# Patient Record
Sex: FEMALE | Race: BLACK OR AFRICAN AMERICAN | NOT HISPANIC OR LATINO | Employment: UNEMPLOYED | URBAN - METROPOLITAN AREA
[De-identification: names, ages, dates, MRNs, and addresses within clinical notes are randomized per-mention and may not be internally consistent; named-entity substitution may affect disease eponyms.]

---

## 2017-03-01 ENCOUNTER — TELEPHONE (OUTPATIENT)
Dept: OBSTETRICS AND GYNECOLOGY | Facility: CLINIC | Age: 42
End: 2017-03-01

## 2017-03-01 DIAGNOSIS — R10.2 PELVIC PAIN: Primary | ICD-10-CM

## 2017-03-01 NOTE — TELEPHONE ENCOUNTER
Orders for pelvic ultrasound, urine culture and urine gonorrhea/chlamdyia placed. The patient will need to call the diagnostic center to have these tests completed before her visit with me on march 8.    Dr pelletier

## 2017-03-01 NOTE — TELEPHONE ENCOUNTER
----- Message from Carey Russo sent at 3/1/2017  9:03 AM CST -----  No. [phone]   Patient is having lower stomach pain.  Patient would like an appointment on Thursday, 3/2/17 or Friday, 3/3/17.        Scheduling patient for 3/8/17 9:45, you can go ahead and put what orders you said you would put in before she comes to the appt and ill call the patient and let her know she can make her appt downstairs

## 2017-03-02 ENCOUNTER — TELEPHONE (OUTPATIENT)
Dept: OBSTETRICS AND GYNECOLOGY | Facility: CLINIC | Age: 42
End: 2017-03-02

## 2017-03-02 NOTE — TELEPHONE ENCOUNTER
Spoke with pt. And told her that she is to go to the Diagnostic Lab, got a urine collection, and to have a pelvic u/s as well, before her appt. Here with us on the 8th

## 2017-05-28 ENCOUNTER — HOSPITAL ENCOUNTER (EMERGENCY)
Facility: HOSPITAL | Age: 42
Discharge: HOME | End: 2017-05-28
Attending: EMERGENCY MEDICINE
Payer: MEDICAID

## 2017-05-28 VITALS
SYSTOLIC BLOOD PRESSURE: 138 MMHG | BODY MASS INDEX: 32.02 KG/M2 | WEIGHT: 174 LBS | HEIGHT: 62 IN | RESPIRATION RATE: 20 BRPM | TEMPERATURE: 99 F | OXYGEN SATURATION: 99 % | HEART RATE: 112 BPM | DIASTOLIC BLOOD PRESSURE: 78 MMHG

## 2017-05-28 DIAGNOSIS — R11.2 NON-INTRACTABLE VOMITING WITH NAUSEA, UNSPECIFIED VOMITING TYPE: Primary | ICD-10-CM

## 2017-05-28 LAB
ALBUMIN SERPL BCP-MCNC: 4.2 G/DL
ALP SERPL-CCNC: 59 U/L
ALT SERPL W/O P-5'-P-CCNC: 21 U/L
ANION GAP SERPL CALC-SCNC: 10 MMOL/L
AST SERPL-CCNC: 20 U/L
B-HCG UR QL: NEGATIVE
B-HCG UR QL: NEGATIVE
BASOPHILS # BLD AUTO: 0 K/UL
BASOPHILS NFR BLD: 0 %
BILIRUB SERPL-MCNC: 0.4 MG/DL
BUN SERPL-MCNC: 11 MG/DL
CALCIUM SERPL-MCNC: 9.7 MG/DL
CHLORIDE SERPL-SCNC: 107 MMOL/L
CO2 SERPL-SCNC: 21 MMOL/L
CREAT SERPL-MCNC: 0.8 MG/DL
CTP QC/QA: YES
CTP QC/QA: YES
DIFFERENTIAL METHOD: ABNORMAL
EOSINOPHIL # BLD AUTO: 0 K/UL
EOSINOPHIL NFR BLD: 0.1 %
ERYTHROCYTE [DISTWIDTH] IN BLOOD BY AUTOMATED COUNT: 12.8 %
EST. GFR  (AFRICAN AMERICAN): >60 ML/MIN/1.73 M^2
EST. GFR  (NON AFRICAN AMERICAN): >60 ML/MIN/1.73 M^2
GLUCOSE SERPL-MCNC: 129 MG/DL
HCT VFR BLD AUTO: 40.4 %
HGB BLD-MCNC: 13.8 G/DL
LYMPHOCYTES # BLD AUTO: 0.8 K/UL
LYMPHOCYTES NFR BLD: 7.5 %
MCH RBC QN AUTO: 32.2 PG
MCHC RBC AUTO-ENTMCNC: 34.2 %
MCV RBC AUTO: 94 FL
MONOCYTES # BLD AUTO: 0.3 K/UL
MONOCYTES NFR BLD: 2.9 %
NEUTROPHILS # BLD AUTO: 10 K/UL
NEUTROPHILS NFR BLD: 89.2 %
PLATELET # BLD AUTO: 241 K/UL
PMV BLD AUTO: 9.1 FL
POTASSIUM SERPL-SCNC: 4.2 MMOL/L
PROT SERPL-MCNC: 8.3 G/DL
RBC # BLD AUTO: 4.28 M/UL
SODIUM SERPL-SCNC: 138 MMOL/L
WBC # BLD AUTO: 11.14 K/UL

## 2017-05-28 PROCEDURE — 96374 THER/PROPH/DIAG INJ IV PUSH: CPT

## 2017-05-28 PROCEDURE — 80053 COMPREHEN METABOLIC PANEL: CPT

## 2017-05-28 PROCEDURE — 99283 EMERGENCY DEPT VISIT LOW MDM: CPT | Mod: 25

## 2017-05-28 PROCEDURE — 96375 TX/PRO/DX INJ NEW DRUG ADDON: CPT

## 2017-05-28 PROCEDURE — 25000003 PHARM REV CODE 250: Performed by: EMERGENCY MEDICINE

## 2017-05-28 PROCEDURE — 81025 URINE PREGNANCY TEST: CPT | Performed by: EMERGENCY MEDICINE

## 2017-05-28 PROCEDURE — 85025 COMPLETE CBC W/AUTO DIFF WBC: CPT

## 2017-05-28 PROCEDURE — 63600175 PHARM REV CODE 636 W HCPCS: Performed by: EMERGENCY MEDICINE

## 2017-05-28 RX ORDER — ONDANSETRON 4 MG/1
8 TABLET, ORALLY DISINTEGRATING ORAL EVERY 6 HOURS PRN
Qty: 12 TABLET | Refills: 0 | Status: SHIPPED | OUTPATIENT
Start: 2017-05-28 | End: 2017-09-06 | Stop reason: ALTCHOICE

## 2017-05-28 RX ORDER — KETOROLAC TROMETHAMINE 30 MG/ML
15 INJECTION, SOLUTION INTRAMUSCULAR; INTRAVENOUS
Status: COMPLETED | OUTPATIENT
Start: 2017-05-28 | End: 2017-05-28

## 2017-05-28 RX ORDER — ONDANSETRON 4 MG/1
4 TABLET, ORALLY DISINTEGRATING ORAL
Status: COMPLETED | OUTPATIENT
Start: 2017-05-28 | End: 2017-05-28

## 2017-05-28 RX ORDER — ONDANSETRON 2 MG/ML
4 INJECTION INTRAMUSCULAR; INTRAVENOUS
Status: COMPLETED | OUTPATIENT
Start: 2017-05-28 | End: 2017-05-28

## 2017-05-28 RX ADMIN — ONDANSETRON 4 MG: 2 INJECTION INTRAMUSCULAR; INTRAVENOUS at 08:05

## 2017-05-28 RX ADMIN — SODIUM CHLORIDE 1000 ML: 0.9 INJECTION, SOLUTION INTRAVENOUS at 08:05

## 2017-05-28 RX ADMIN — ONDANSETRON 4 MG: 4 TABLET, ORALLY DISINTEGRATING ORAL at 09:05

## 2017-05-28 RX ADMIN — KETOROLAC TROMETHAMINE 15 MG: 30 INJECTION, SOLUTION INTRAMUSCULAR at 08:05

## 2017-05-28 NOTE — ED PROVIDER NOTES
"Encounter Date: 2017       History     Chief Complaint   Patient presents with    Abdominal Pain     vomiting and diarrhea - began at 0100 this morning; abscess under left arm     Review of patient's allergies indicates:  No Known Allergies  41-year-old female presents to the emergency department with vomiting and diarrhea that started late last night.  She was able to keep tender down and this morning ate [employer], and started feeling nausea hot sweats and abdominal pain.  She reports having intermittent abdominal pain after eating, that is "ignorable" she has not had any chest pain, shortness of breath dysuria or back pain.          History reviewed. No pertinent past medical history.  Past Surgical History:   Procedure Laterality Date     SECTION      TUBAL LIGATION       Family History   Problem Relation Age of Onset    Breast cancer Sister     Hypertension Maternal Grandfather     Glaucoma Maternal Grandfather     Cancer Father     Hypertension Maternal Uncle     Glaucoma Maternal Uncle     Hypertension Maternal Aunt     Glaucoma Maternal Aunt     Hypertension Other      Social History   Substance Use Topics    Smoking status: Never Smoker    Smokeless tobacco: Never Used    Alcohol use No     Review of Systems   Eyes: Negative.    Endocrine: Negative.    Genitourinary: Negative.    All other systems reviewed and are negative.      Physical Exam     Initial Vitals [17 0725]   BP Pulse Resp Temp SpO2   138/78 (!) 112 20 98.6 °F (37 °C) 99 %     Physical Exam    Nursing note and vitals reviewed.  Constitutional: She appears well-developed and well-nourished. She appears distressed.   HENT:   Head: Normocephalic and atraumatic.   Eyes: EOM are normal. Pupils are equal, round, and reactive to light.   Neck: Normal range of motion. Neck supple.   Cardiovascular: Normal rate and normal heart sounds.   Pulmonary/Chest: Breath sounds normal.   Abdominal: Soft.   RUQ abdominal pain " and midepigastric tenderness  No suprapubic tenderness, no CVA tenderness   Musculoskeletal: Normal range of motion.   Neurological: She is alert and oriented to person, place, and time. She has normal strength. No cranial nerve deficit.   Skin: Skin is warm and dry.   Psychiatric: She has a normal mood and affect. Her behavior is normal. Thought content normal.         ED Course   Procedures  Labs Reviewed   COMPREHENSIVE METABOLIC PANEL   CBC W/ AUTO DIFFERENTIAL   PREGNANCY TEST, URINE RAPID             Medical Decision Making:   Initial Assessment:   42 yo F here with epigastric pain, nausea and vomiting  Differential Diagnosis:   Gastroenteritis, gastritis, peptic ulcer, cholelithiasis  Clinical Tests:   Lab Tests: Ordered and Reviewed  ED Management:  She felt better after zofran and toradol, was tolerating PO  She asked for zofran script.  Reviewed blood work with her, and given her work note.   Patient was counseled on reasons to return to the ED and expressed understanding, patient was discharged in stable condition with appropriate outpatient follow up plan.                      ED Course     Clinical Impression:   The encounter diagnosis was Non-intractable vomiting with nausea, unspecified vomiting type.          Mary Haynes MD  05/28/17 0465

## 2017-05-28 NOTE — DISCHARGE INSTRUCTIONS
1) PLEASE FOLLOW UP WITH YOUR PRIMARY CARE PROVIDER IN 3 DAYS IF YOU ARE NOT SIGNIFICANTLY IMPROVED  2) PLEASE TAKE YOUR MEDICATIONS AS PRESCRIBED  3) RETURN TO THE ED FOR WORSENING SYMPTOMS

## 2017-09-06 ENCOUNTER — HOSPITAL ENCOUNTER (EMERGENCY)
Facility: HOSPITAL | Age: 42
Discharge: HOME | End: 2017-09-06
Attending: EMERGENCY MEDICINE
Payer: MEDICAID

## 2017-09-06 VITALS
HEART RATE: 79 BPM | HEIGHT: 62 IN | OXYGEN SATURATION: 99 % | TEMPERATURE: 98 F | WEIGHT: 170 LBS | DIASTOLIC BLOOD PRESSURE: 70 MMHG | RESPIRATION RATE: 18 BRPM | BODY MASS INDEX: 31.28 KG/M2 | SYSTOLIC BLOOD PRESSURE: 146 MMHG

## 2017-09-06 DIAGNOSIS — K52.9 GASTROENTERITIS: ICD-10-CM

## 2017-09-06 DIAGNOSIS — R10.9 ABDOMINAL PAIN, UNSPECIFIED LOCATION: Primary | ICD-10-CM

## 2017-09-06 LAB
ALBUMIN SERPL BCP-MCNC: 3.9 G/DL
ALP SERPL-CCNC: 73 U/L
ALT SERPL W/O P-5'-P-CCNC: 14 U/L
ANION GAP SERPL CALC-SCNC: 11 MMOL/L
AST SERPL-CCNC: 16 U/L
B-HCG UR QL: NEGATIVE
BASOPHILS # BLD AUTO: 0.01 K/UL
BASOPHILS NFR BLD: 0.1 %
BILIRUB SERPL-MCNC: 0.3 MG/DL
BILIRUB UR QL STRIP: NEGATIVE
BUN SERPL-MCNC: 8 MG/DL
CALCIUM SERPL-MCNC: 9.5 MG/DL
CHLORIDE SERPL-SCNC: 107 MMOL/L
CLARITY UR: CLEAR
CO2 SERPL-SCNC: 22 MMOL/L
COLOR UR: YELLOW
CREAT SERPL-MCNC: 0.7 MG/DL
CTP QC/QA: YES
DIFFERENTIAL METHOD: ABNORMAL
EOSINOPHIL # BLD AUTO: 0 K/UL
EOSINOPHIL NFR BLD: 0.2 %
ERYTHROCYTE [DISTWIDTH] IN BLOOD BY AUTOMATED COUNT: 12.3 %
EST. GFR  (AFRICAN AMERICAN): >60 ML/MIN/1.73 M^2
EST. GFR  (NON AFRICAN AMERICAN): >60 ML/MIN/1.73 M^2
GLUCOSE SERPL-MCNC: 115 MG/DL
GLUCOSE UR QL STRIP: NEGATIVE
HCT VFR BLD AUTO: 40.2 %
HGB BLD-MCNC: 13.6 G/DL
HGB UR QL STRIP: ABNORMAL
KETONES UR QL STRIP: ABNORMAL
LEUKOCYTE ESTERASE UR QL STRIP: NEGATIVE
LIPASE SERPL-CCNC: 15 U/L
LYMPHOCYTES # BLD AUTO: 0.8 K/UL
LYMPHOCYTES NFR BLD: 5.7 %
MCH RBC QN AUTO: 31.6 PG
MCHC RBC AUTO-ENTMCNC: 33.8 G/DL
MCV RBC AUTO: 93 FL
MONOCYTES # BLD AUTO: 0.5 K/UL
MONOCYTES NFR BLD: 3.8 %
NEUTROPHILS # BLD AUTO: 11.9 K/UL
NEUTROPHILS NFR BLD: 89.7 %
NITRITE UR QL STRIP: NEGATIVE
PH UR STRIP: 7 [PH] (ref 5–8)
PLATELET # BLD AUTO: 278 K/UL
PMV BLD AUTO: 9 FL
POTASSIUM SERPL-SCNC: 4 MMOL/L
PROT SERPL-MCNC: 8.1 G/DL
PROT UR QL STRIP: ABNORMAL
RBC # BLD AUTO: 4.31 M/UL
SODIUM SERPL-SCNC: 140 MMOL/L
SP GR UR STRIP: 1.01 (ref 1–1.03)
URN SPEC COLLECT METH UR: ABNORMAL
UROBILINOGEN UR STRIP-ACNC: NEGATIVE EU/DL
WBC # BLD AUTO: 13.21 K/UL

## 2017-09-06 PROCEDURE — 96361 HYDRATE IV INFUSION ADD-ON: CPT

## 2017-09-06 PROCEDURE — 96375 TX/PRO/DX INJ NEW DRUG ADDON: CPT

## 2017-09-06 PROCEDURE — 63600175 PHARM REV CODE 636 W HCPCS: Performed by: PHYSICIAN ASSISTANT

## 2017-09-06 PROCEDURE — 25000003 PHARM REV CODE 250: Performed by: PHYSICIAN ASSISTANT

## 2017-09-06 PROCEDURE — 96374 THER/PROPH/DIAG INJ IV PUSH: CPT

## 2017-09-06 PROCEDURE — 85025 COMPLETE CBC W/AUTO DIFF WBC: CPT

## 2017-09-06 PROCEDURE — 80053 COMPREHEN METABOLIC PANEL: CPT

## 2017-09-06 PROCEDURE — 63600175 PHARM REV CODE 636 W HCPCS: Performed by: EMERGENCY MEDICINE

## 2017-09-06 PROCEDURE — 81025 URINE PREGNANCY TEST: CPT | Performed by: PHYSICIAN ASSISTANT

## 2017-09-06 PROCEDURE — 99284 EMERGENCY DEPT VISIT MOD MDM: CPT | Mod: 25

## 2017-09-06 PROCEDURE — 81003 URINALYSIS AUTO W/O SCOPE: CPT

## 2017-09-06 PROCEDURE — 83690 ASSAY OF LIPASE: CPT

## 2017-09-06 RX ORDER — MORPHINE SULFATE 2 MG/ML
6 INJECTION, SOLUTION INTRAMUSCULAR; INTRAVENOUS
Status: COMPLETED | OUTPATIENT
Start: 2017-09-06 | End: 2017-09-06

## 2017-09-06 RX ORDER — DICYCLOMINE HYDROCHLORIDE 20 MG/1
20 TABLET ORAL 2 TIMES DAILY
Qty: 20 TABLET | Refills: 0 | Status: SHIPPED | OUTPATIENT
Start: 2017-09-06 | End: 2017-10-06

## 2017-09-06 RX ORDER — ONDANSETRON 4 MG/1
4 TABLET, ORALLY DISINTEGRATING ORAL EVERY 8 HOURS PRN
Qty: 15 TABLET | Refills: 0 | Status: SHIPPED | OUTPATIENT
Start: 2017-09-06 | End: 2018-06-27

## 2017-09-06 RX ORDER — DIPHENHYDRAMINE HCL 25 MG
25 CAPSULE ORAL
Status: COMPLETED | OUTPATIENT
Start: 2017-09-06 | End: 2017-09-06

## 2017-09-06 RX ORDER — ONDANSETRON 2 MG/ML
4 INJECTION INTRAMUSCULAR; INTRAVENOUS
Status: COMPLETED | OUTPATIENT
Start: 2017-09-06 | End: 2017-09-06

## 2017-09-06 RX ADMIN — MORPHINE SULFATE 6 MG: 2 INJECTION, SOLUTION INTRAMUSCULAR; INTRAVENOUS at 03:09

## 2017-09-06 RX ADMIN — ONDANSETRON 4 MG: 2 INJECTION INTRAMUSCULAR; INTRAVENOUS at 02:09

## 2017-09-06 RX ADMIN — SODIUM CHLORIDE 1000 ML: 0.9 INJECTION, SOLUTION INTRAVENOUS at 02:09

## 2017-09-06 RX ADMIN — DIPHENHYDRAMINE HYDROCHLORIDE 25 MG: 25 CAPSULE ORAL at 04:09

## 2017-09-06 NOTE — ED PROVIDER NOTES
Encounter Date: 2017       History     Chief Complaint   Patient presents with    Dysuria     left back pain with nausea, and vomiting and dysuria since this morning.     Afebrile female with PMH of post partum depression and frequent mariguana use presents to the ED for evaluation of abdominal pain for the past one day. She intimally reported dysuria however denies any pain with urination but rather vaginal pressure that is exacerbated by urination. She describes this as a sharp sensation that radiates to the left flank and back with waxing and waning of severity. She does report nausea with multiple episodes of nonbloody/nonbilous emesis and few episodes of loose stool. She c/o chills associated with these episodes. She denies any fever, URI symptoms, CP, SOB, hematuria, vaginal bleeding or vaginal discharge. She denies any recent travel, sick contacts or recent ABX use. She has not tried any medications for the symptoms.       The history is provided by the patient.     Review of patient's allergies indicates:  No Known Allergies  History reviewed. No pertinent past medical history.  Past Surgical History:   Procedure Laterality Date     SECTION      TUBAL LIGATION       Family History   Problem Relation Age of Onset    Breast cancer Sister     Hypertension Maternal Grandfather     Glaucoma Maternal Grandfather     Cancer Father     Hypertension Maternal Uncle     Glaucoma Maternal Uncle     Hypertension Maternal Aunt     Glaucoma Maternal Aunt     Hypertension Other      Social History   Substance Use Topics    Smoking status: Never Smoker    Smokeless tobacco: Never Used    Alcohol use No     Review of Systems   Constitutional: Positive for appetite change and chills. Negative for fever.   HENT: Negative for congestion and sore throat.    Respiratory: Negative for cough and shortness of breath.    Cardiovascular: Negative for chest pain.   Gastrointestinal: Positive for abdominal pain  (lower abdominal pain), nausea and vomiting. Negative for constipation and diarrhea.   Genitourinary: Positive for flank pain. Negative for dysuria, hematuria, vaginal bleeding and vaginal discharge.   Musculoskeletal: Negative for back pain.   Skin: Negative for rash.   Neurological: Negative for weakness and headaches.   Hematological: Does not bruise/bleed easily.       Physical Exam     Initial Vitals [09/06/17 1416]   BP Pulse Resp Temp SpO2   134/70 91 15 97.8 °F (36.6 °C) 100 %      MAP       91.33         Physical Exam    Nursing note and vitals reviewed.  Constitutional: Vital signs are normal. She appears well-developed and well-nourished. She is cooperative.  Non-toxic appearance. She does not appear ill.   HENT:   Head: Normocephalic and atraumatic.   Mouth/Throat: Oropharynx is clear and moist. Mucous membranes are dry.   Eyes: Conjunctivae and lids are normal.   Neck: Neck supple. No neck rigidity.   Cardiovascular: Normal rate and regular rhythm.   Pulmonary/Chest: Breath sounds normal. No respiratory distress. She has no wheezes. She has no rhonchi.   Abdominal: Soft. Normal appearance and bowel sounds are normal. There is tenderness in the suprapubic area and left lower quadrant. There is no rigidity, no rebound, no guarding and no CVA tenderness.   Musculoskeletal: Normal range of motion.   Neurological: She is alert and oriented to person, place, and time. GCS eye subscore is 4. GCS verbal subscore is 5. GCS motor subscore is 6.   Skin: Skin is warm, dry and intact. No rash noted.   Psychiatric: She has a normal mood and affect. Her speech is normal and behavior is normal. Thought content normal.         ED Course   Procedures  Labs Reviewed   URINALYSIS - Abnormal; Notable for the following:        Result Value    Protein, UA Trace (*)     Ketones, UA Trace (*)     Occult Blood UA Trace (*)     All other components within normal limits   CBC W/ AUTO DIFFERENTIAL - Abnormal; Notable for the  following:     WBC 13.21 (*)     MCH 31.6 (*)     MPV 9.0 (*)     Gran # 11.9 (*)     Lymph # 0.8 (*)     Gran% 89.7 (*)     Lymph% 5.7 (*)     Mono% 3.8 (*)     All other components within normal limits   COMPREHENSIVE METABOLIC PANEL - Abnormal; Notable for the following:     CO2 22 (*)     Glucose 115 (*)     All other components within normal limits   LIPASE   POCT URINE PREGNANCY        Imaging Results          CT Renal Stone Study ABD Pelvis WO (Final result)  Result time 09/06/17 15:56:07    Final result by Esteban Gomez MD (09/06/17 15:56:07)                 Impression:     CT abdomen pelvis, urinary tract within range of normal.  Tiny density medial cecum of uncertain significance etiology.      Electronically signed by: FRANK GOMEZ MD  Date:     09/06/17  Time:    15:56              Narrative:    CT scan abdomen pelvis, urinary tract without contrast.  Uterus adnexal structures, urinary bladder normal.    Liver, gallbladder, spleen, adrenal glands, normal.    No free fluid or adenopathy.  Facet joint and arthropathy, especially right L5-S1, spondylosis dorsal and lumbar spine.    Lung bases clear.  No free air.    Appendix normal.  Circular density medial cecum, 0.58 CM image 54 series 2, image 24 series 601, uncertain significance etiology.                                 Medical Decision Making:   History:   Old Records Summarized: other records.       <> Summary of Records: Reviewed last ED visit in 05/17   Initial Assessment:   Afebrile female with PMH of post partum depression and frequent mariguana use presents to the ED for evaluation of abdominal pain for the past one day. She intimally reported dysuria however denies any pain with urination but rather vaginal pressure that is exacerbated by urination. She describes this as a sharp sensation that radiates to the left flank and back with waxing and waning of severity. She does report nausea with multiple episodes of  nonbloody/nonbilous emesis and few episodes of loose stool. She c/o chills associated with these episodes. She denies any fever, URI symptoms, CP, SOB, hematuria, vaginal bleeding or vaginal discharge. She denies any recent travel, sick contacts or recent ABX use. She has not tried any medications for the symptoms. PE reveals patient in no obvious distress. Mucous membranes are noted to be mildly dry. Lungs clear; heart regular rate and rhythm. Abdomen with TTP of the suprapubic and LLQ with no rebound, guarding or rigidity. FROM of all extremities skin free of rash, pallor or diaphoresis.  Differential Diagnosis:   UTI, peylonnephritis, gastroenteritis, enteritis, diverticulitis, cannabis hyperemeses, LIBRA, electrolyte abnormality  Clinical Tests:   Lab Tests: Ordered and Reviewed  Radiological Study: Ordered and Reviewed  ED Management:  Labs significant for mild leukocytosis with no other significant abnormal findings including UA. CT ordered as patient with TTP and elevated leukocytosis.. CT is unremarkable for acute abnormalities. I did consider vaginal source however patient denies any symptoms presently. Symptoms did improve in the ED with fluids, Zofran and morphine. Patient had slight local histamine response to morphine that resolved with benadryl. She did have successful PO challenge. And will be send home with symptomatic medications for this probable viral etiology. Instructed on diet for N/V/D. Patient was cautioned on when to return to ED. Patient verbalized understanding and agreement with the treatment plan                Attending Attestation:     Physician Attestation Statement for NP/PA:   I discussed this assessment and plan of this patient with the NP/PA, but I did not personally examine the patient. The face to face encounter was performed by the NP/PA.    Other NP/PA Attestation Additions:    History of Present Illness: 42F with LLQ abd pain and vaginal pressure    Medical Decision Making: No  fever, mild leukocytosis.  No other acute lab abnormalities.  CT did not show any acute findings to explain symptoms.                 ED Course      Clinical Impression:   The primary encounter diagnosis was Abdominal pain, unspecified location. A diagnosis of Gastroenteritis was also pertinent to this visit.                           AROLDO Fierro  09/10/17 [id number]       Shey Mathews MD  09/28/17 [id number]

## 2017-09-06 NOTE — ED NOTES
N/v/d with left flank pain and dysuria that began this am.  Reports has had episodes of sweating and chills and feeling hot today

## 2018-06-27 ENCOUNTER — HOSPITAL ENCOUNTER (OUTPATIENT)
Dept: RADIOLOGY | Facility: HOSPITAL | Age: 43
Discharge: HOME | End: 2018-06-27
Attending: OBSTETRICS & GYNECOLOGY
Payer: MEDICAID

## 2018-06-27 ENCOUNTER — TELEPHONE (OUTPATIENT)
Dept: OBSTETRICS AND GYNECOLOGY | Facility: CLINIC | Age: 43
End: 2018-06-27

## 2018-06-27 ENCOUNTER — OFFICE VISIT (OUTPATIENT)
Dept: OBSTETRICS AND GYNECOLOGY | Facility: CLINIC | Age: 43
End: 2018-06-27
Payer: MEDICAID

## 2018-06-27 ENCOUNTER — LAB VISIT (OUTPATIENT)
Dept: LAB | Facility: HOSPITAL | Age: 43
End: 2018-06-27
Attending: OBSTETRICS & GYNECOLOGY
Payer: MEDICAID

## 2018-06-27 VITALS
WEIGHT: 164.25 LBS | DIASTOLIC BLOOD PRESSURE: 80 MMHG | HEIGHT: 63 IN | SYSTOLIC BLOOD PRESSURE: 140 MMHG | BODY MASS INDEX: 29.1 KG/M2

## 2018-06-27 DIAGNOSIS — N92.0 MENORRHAGIA WITH REGULAR CYCLE: ICD-10-CM

## 2018-06-27 DIAGNOSIS — Z01.419 ENCOUNTER FOR GYNECOLOGICAL EXAMINATION WITHOUT ABNORMAL FINDING: ICD-10-CM

## 2018-06-27 DIAGNOSIS — Z12.4 CERVICAL CANCER SCREENING: ICD-10-CM

## 2018-06-27 DIAGNOSIS — Z01.419 ENCOUNTER FOR GYNECOLOGICAL EXAMINATION WITHOUT ABNORMAL FINDING: Primary | ICD-10-CM

## 2018-06-27 DIAGNOSIS — Z12.39 SCREENING BREAST EXAMINATION: ICD-10-CM

## 2018-06-27 DIAGNOSIS — Z12.39 BREAST CANCER SCREENING: ICD-10-CM

## 2018-06-27 DIAGNOSIS — R30.0 DYSURIA: ICD-10-CM

## 2018-06-27 LAB
BASOPHILS # BLD AUTO: 0.03 K/UL
BASOPHILS NFR BLD: 0.3 %
DIFFERENTIAL METHOD: ABNORMAL
EOSINOPHIL # BLD AUTO: 0.2 K/UL
EOSINOPHIL NFR BLD: 1.8 %
ERYTHROCYTE [DISTWIDTH] IN BLOOD BY AUTOMATED COUNT: 12.9 %
HCT VFR BLD AUTO: 37.7 %
HGB BLD-MCNC: 12.4 G/DL
LYMPHOCYTES # BLD AUTO: 3.1 K/UL
LYMPHOCYTES NFR BLD: 35.8 %
MCH RBC QN AUTO: 30.8 PG
MCHC RBC AUTO-ENTMCNC: 32.9 G/DL
MCV RBC AUTO: 94 FL
MONOCYTES # BLD AUTO: 0.7 K/UL
MONOCYTES NFR BLD: 7.8 %
NEUTROPHILS # BLD AUTO: 4.7 K/UL
NEUTROPHILS NFR BLD: 54.2 %
PLATELET # BLD AUTO: 303 K/UL
PMV BLD AUTO: 9 FL
RBC # BLD AUTO: 4.02 M/UL
TSH SERPL DL<=0.005 MIU/L-ACNC: 1.4 UIU/ML
WBC # BLD AUTO: 8.67 K/UL

## 2018-06-27 PROCEDURE — 86803 HEPATITIS C AB TEST: CPT

## 2018-06-27 PROCEDURE — 88175 CYTOPATH C/V AUTO FLUID REDO: CPT

## 2018-06-27 PROCEDURE — 86592 SYPHILIS TEST NON-TREP QUAL: CPT

## 2018-06-27 PROCEDURE — 85025 COMPLETE CBC W/AUTO DIFF WBC: CPT

## 2018-06-27 PROCEDURE — 99213 OFFICE O/P EST LOW 20 MIN: CPT | Mod: PBBFAC,PO | Performed by: OBSTETRICS & GYNECOLOGY

## 2018-06-27 PROCEDURE — 87624 HPV HI-RISK TYP POOLED RSLT: CPT

## 2018-06-27 PROCEDURE — 99396 PREV VISIT EST AGE 40-64: CPT | Mod: S$PBB,,, | Performed by: OBSTETRICS & GYNECOLOGY

## 2018-06-27 PROCEDURE — 77067 SCR MAMMO BI INCL CAD: CPT | Mod: TC

## 2018-06-27 PROCEDURE — 87491 CHLMYD TRACH DNA AMP PROBE: CPT

## 2018-06-27 PROCEDURE — 87086 URINE CULTURE/COLONY COUNT: CPT

## 2018-06-27 PROCEDURE — 84443 ASSAY THYROID STIM HORMONE: CPT

## 2018-06-27 PROCEDURE — 87340 HEPATITIS B SURFACE AG IA: CPT

## 2018-06-27 PROCEDURE — 99999 PR PBB SHADOW E&M-EST. PATIENT-LVL III: CPT | Mod: PBBFAC,,, | Performed by: OBSTETRICS & GYNECOLOGY

## 2018-06-27 PROCEDURE — 36415 COLL VENOUS BLD VENIPUNCTURE: CPT

## 2018-06-27 PROCEDURE — 86696 HERPES SIMPLEX TYPE 2 TEST: CPT

## 2018-06-27 PROCEDURE — 86703 HIV-1/HIV-2 1 RESULT ANTBDY: CPT

## 2018-06-27 PROCEDURE — 77063 BREAST TOMOSYNTHESIS BI: CPT | Mod: 26,,, | Performed by: RADIOLOGY

## 2018-06-27 PROCEDURE — 77067 SCR MAMMO BI INCL CAD: CPT | Mod: 26,,, | Performed by: RADIOLOGY

## 2018-06-27 PROCEDURE — 86706 HEP B SURFACE ANTIBODY: CPT

## 2018-06-27 NOTE — TELEPHONE ENCOUNTER
----- Message from Adelaida Chen sent at 6/27/2018  2:30 PM CDT -----  Contact: [facility]  The patient's authorization for her MAMMO needs to be filled out since she was able to be seen today for the procedure.

## 2018-06-27 NOTE — MEDICAL/APP STUDENT
"44 yo  female presenting for routine gyn visit.   LMP 18  Last mammogram 2014  wnl  Last pap smear 2014 wnl.  Patient requests Pap smear today.    Patient has several complaints:    1) menorrhagia  Patient complains of regular heavy menses that started 4 months ago.  Menarche began at 11.  Always regular.  No AUB.   Patient is not current on any Rx.   Patient reports weight loss of 20 lbs over the past month - part of it was intentional.   Patient reports palpitations and "feeling whole body is hot" in the past few months.    2) urine Sx:   Patient complains of urinary frequency, urgency and stress incontinence, as well as flank pain and lower back pain (10 out of 10).  She tried ibuprofen which first relieve the pain and now does not anymore.     3) sexual hx:  Sexually active with a long-term male partner.   Patient had BTL in .   No h/o STI  Patient requests complete STI testing today.    4) patient also reports brown odorous vaginal discharge "every now and then".     ROS:  See HPI    PE:     Vitals: BP (!) 140/80   Ht 5' 3" (1.6 m)   Wt 74.5 kg (164 lb 3.9 oz)   LMP 2018   Breastfeeding? No   BMI 29.09 kg/m²   APPEARANCE: Well nourished, well developed, in no acute distress.  CHEST: Lungs clear to auscultation.  HEART: Regular rate and rhythm, no murmurs, rubs or gallops.  BREASTS: Symmetrical, no skin changes or visible lesions. No palpable masses, nipple discharge or adenopathy bilaterally.   PELVIC: Normal external female genitalia without lesions. Normal hair distribution. Vagina moist and well rugated without lesions or discharge. Cervix pink and without lesions. Bimanual exam showed uterus normal size, shape, position, mobile. Suprapubic tenderness.  Adnexa without masses or tenderness.     AP    1) Routine gyn  -s/p normal breast exam  -Pap and HPV: collected at patient's request  -STD testing: ordered  -mammogram: ordered  -patient given list of PCPs    2) " menorrhagia  -CBC, TSH levels ordered  -pelvic US ordered    3) possible UTI  -pelvic exam showed suprapubic tenderness  -urine culture ordered        Amy Shaw  Medical student

## 2018-06-27 NOTE — PROGRESS NOTES
"42 yo  female who presents for routine gyn visit.  Patient reports that cycles come q month but have become quite heavy in the last 4 months.  Patient reports that she can soak many pads in a day which is different from previous cycles.  LMP 18.      Patient denies any new sexual partners since her last visit here. But, desires STD testing.  She also complains of increased urinary frequency, suprapubic discomfort, and pain in her lower back (mostly on the left side).    Patient denies h/o STDs.  H/o BTL in 2016.    Last mammogram 2014  wnl  Last pap smear 2014 wnl.  Patient requests Pap smear today.    ROS: positive for weight loss (20 pds), reports feeling "hot all over"       PE:   Vitals: BP (!) 140/80   Ht 5' 3" (1.6 m)   Wt 74.5 kg (164 lb 3.9 oz)   LMP 2018   Breastfeeding? No   BMI 29.09 kg/m²   APPEARANCE: Well nourished, well developed, in no acute distress.  CHEST: Lungs clear to auscultation.  HEART: Regular rate and rhythm, no murmurs, rubs or gallops.  Abdomen: no rebound, no guarding. No masses.  BREASTS: Symmetrical, no skin changes or visible lesions. No palpable masses, nipple discharge or adenopathy bilaterally.   PELVIC: Normal external female genitalia without lesions. Normal hair distribution. Vagina moist and well rugated without lesions or discharge. Cervix pink and without lesions. Bimanual exam showed uterus normal size, shape, position, mobile. Suprapubic tenderness.  Adnexa without masses or tenderness.      AP     1) Routine gyn  -s/p normal breast exam - mammogram ordered  -Pap and HPV: collected  -STD testing: gc/chl, hiv, rpr, hsv, hep b/c  -patient given list of PCPs     2) Menorrhagia  -CBC, TSH levels ordered  -pelvic US ordered     3) possible UTI  -urine culture ordered     NATALI pelletier MD    "

## 2018-06-27 NOTE — TELEPHONE ENCOUNTER
"I spoke with the patient and she says she had her mammogram done today and "they" said it needed to be cleared or she might get a bill.  I explained that we don't do anything with the insurance but Dr. Flower had done the order and if she got a bill, she would need to follow up with the billing department.  She understood and had no further questions.  "

## 2018-06-28 ENCOUNTER — PROCEDURE VISIT (OUTPATIENT)
Dept: OBSTETRICS AND GYNECOLOGY | Facility: CLINIC | Age: 43
End: 2018-06-28
Payer: MEDICAID

## 2018-06-28 DIAGNOSIS — N92.0 MENORRHAGIA WITH REGULAR CYCLE: ICD-10-CM

## 2018-06-28 DIAGNOSIS — R10.2 PELVIC PAIN IN FEMALE: Primary | ICD-10-CM

## 2018-06-28 LAB
HBV SURFACE AB SER-ACNC: POSITIVE M[IU]/ML
HBV SURFACE AG SERPL QL IA: NEGATIVE
HCV AB SERPL QL IA: NEGATIVE
HIV 1+2 AB+HIV1 P24 AG SERPL QL IA: NEGATIVE
RPR SER QL: NORMAL

## 2018-06-28 PROCEDURE — 76830 TRANSVAGINAL US NON-OB: CPT | Mod: PBBFAC,PO

## 2018-06-28 PROCEDURE — 76830 TRANSVAGINAL US NON-OB: CPT | Mod: 26,S$PBB,, | Performed by: OBSTETRICS & GYNECOLOGY

## 2018-06-29 LAB
BACTERIA UR CULT: NORMAL
BACTERIA UR CULT: NORMAL
C TRACH DNA SPEC QL NAA+PROBE: NOT DETECTED
HSV1 IGG SERPL QL IA: POSITIVE
HSV2 IGG SERPL QL IA: POSITIVE
N GONORRHOEA DNA SPEC QL NAA+PROBE: NOT DETECTED

## 2018-07-02 ENCOUNTER — TELEPHONE (OUTPATIENT)
Dept: OBSTETRICS AND GYNECOLOGY | Facility: CLINIC | Age: 43
End: 2018-07-02

## 2018-07-02 NOTE — TELEPHONE ENCOUNTER
----- Message from Radha Flower MD sent at 7/2/2018  3:41 AM CDT -----  Inform patient that her vaginal swabs for STDs was negative for infections.

## 2018-07-03 ENCOUNTER — TELEPHONE (OUTPATIENT)
Dept: OBSTETRICS AND GYNECOLOGY | Facility: CLINIC | Age: 43
End: 2018-07-03

## 2018-07-03 LAB
HPV HR 12 DNA CVX QL NAA+PROBE: NEGATIVE
HPV16 AG SPEC QL: NEGATIVE
HPV18 DNA SPEC QL NAA+PROBE: NEGATIVE

## 2018-07-06 ENCOUNTER — TELEPHONE (OUTPATIENT)
Dept: OBSTETRICS AND GYNECOLOGY | Facility: CLINIC | Age: 43
End: 2018-07-06

## 2018-07-06 NOTE — TELEPHONE ENCOUNTER
Pt. Was given normal u/s results, and will contact us back about her heavy bleeding, if she would like to proceed with meds. Or surgery, she said thank you for calling her.

## 2019-12-26 ENCOUNTER — HOSPITAL ENCOUNTER (EMERGENCY)
Facility: HOSPITAL | Age: 44
Discharge: HOME | End: 2019-12-26
Attending: EMERGENCY MEDICINE
Payer: MEDICAID

## 2019-12-26 VITALS
HEART RATE: 88 BPM | TEMPERATURE: 99 F | HEIGHT: 62 IN | BODY MASS INDEX: 27.6 KG/M2 | OXYGEN SATURATION: 100 % | SYSTOLIC BLOOD PRESSURE: 170 MMHG | DIASTOLIC BLOOD PRESSURE: 82 MMHG | WEIGHT: 150 LBS | RESPIRATION RATE: 20 BRPM

## 2019-12-26 DIAGNOSIS — R52 PAIN: ICD-10-CM

## 2019-12-26 DIAGNOSIS — M25.512 ACUTE PAIN OF LEFT SHOULDER: Primary | ICD-10-CM

## 2019-12-26 LAB
B-HCG UR QL: NEGATIVE
CTP QC/QA: YES

## 2019-12-26 PROCEDURE — 96372 THER/PROPH/DIAG INJ SC/IM: CPT

## 2019-12-26 PROCEDURE — 25000003 PHARM REV CODE 250: Performed by: NURSE PRACTITIONER

## 2019-12-26 PROCEDURE — 99284 EMERGENCY DEPT VISIT MOD MDM: CPT | Mod: 25

## 2019-12-26 PROCEDURE — 63600175 PHARM REV CODE 636 W HCPCS: Performed by: NURSE PRACTITIONER

## 2019-12-26 RX ORDER — LIDOCAINE 50 MG/G
1 PATCH TOPICAL DAILY
Qty: 3 PATCH | Refills: 0 | Status: SHIPPED | OUTPATIENT
Start: 2019-12-26 | End: 2022-01-04

## 2019-12-26 RX ORDER — METHOCARBAMOL 500 MG/1
1500 TABLET, FILM COATED ORAL 3 TIMES DAILY
Qty: 30 TABLET | Refills: 0 | Status: SHIPPED | OUTPATIENT
Start: 2019-12-26 | End: 2019-12-31

## 2019-12-26 RX ORDER — KETOROLAC TROMETHAMINE 30 MG/ML
30 INJECTION, SOLUTION INTRAMUSCULAR; INTRAVENOUS
Status: COMPLETED | OUTPATIENT
Start: 2019-12-26 | End: 2019-12-26

## 2019-12-26 RX ORDER — LIDOCAINE 50 MG/G
1 PATCH TOPICAL ONCE
Status: DISCONTINUED | OUTPATIENT
Start: 2019-12-26 | End: 2019-12-26 | Stop reason: HOSPADM

## 2019-12-26 RX ORDER — KETOROLAC TROMETHAMINE 10 MG/1
10 TABLET, FILM COATED ORAL
Qty: 14 TABLET | Refills: 0 | Status: SHIPPED | OUTPATIENT
Start: 2019-12-26 | End: 2022-01-04

## 2019-12-26 RX ORDER — METHOCARBAMOL 750 MG/1
1500 TABLET, FILM COATED ORAL
Status: COMPLETED | OUTPATIENT
Start: 2019-12-26 | End: 2019-12-26

## 2019-12-26 RX ADMIN — KETOROLAC TROMETHAMINE 30 MG: 30 INJECTION, SOLUTION INTRAMUSCULAR at 04:12

## 2019-12-26 RX ADMIN — LIDOCAINE 1 PATCH: 50 PATCH TOPICAL at 04:12

## 2019-12-26 RX ADMIN — METHOCARBAMOL TABLETS 1500 MG: 750 TABLET, COATED ORAL at 04:12

## 2019-12-26 NOTE — ED PROVIDER NOTES
Encounter Date: 2019       History     Chief Complaint   Patient presents with    Shoulder Pain     left shoulder pain x 4 days ago denies any trauma      44-year-old female presents ED for evaluation of left shoulder pain x4 days.  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  Patient states that the pain did not get worse until today.  Has been taking Tylenol arthritis with no improvement.  States the pain is worse with movement palpation.  Denies any previous injuries.  Denies any alleviating factors.  Denies fever, numbness, tingling, weakness, chest pain, shortness of breath, or any other concerns.    The history is provided by the patient.     Review of patient's allergies indicates:  No Known Allergies  History reviewed. No pertinent past medical history.  Past Surgical History:   Procedure Laterality Date     SECTION      TUBAL LIGATION       Family History   Problem Relation Age of Onset    Breast cancer Sister     Hypertension Maternal Grandfather     Glaucoma Maternal Grandfather     Cancer Father     Hypertension Maternal Uncle     Glaucoma Maternal Uncle     Hypertension Maternal Aunt     Glaucoma Maternal Aunt     Hypertension Other      Social History     Tobacco Use    Smoking status: Never Smoker    Smokeless tobacco: Never Used   Substance Use Topics    Alcohol use: No    Drug use: Yes     Types: Marijuana     Review of Systems   Constitutional: Negative for fever.   Respiratory: Negative for shortness of breath.    Cardiovascular: Negative for chest pain.   Musculoskeletal: Positive for arthralgias.   Neurological: Negative for weakness and numbness.   Hematological: Does not bruise/bleed easily.   All other systems reviewed and are negative.      Physical Exam     Initial Vitals [19 1458]   BP Pulse Resp Temp SpO2   (!) 154/84 108 18 99.1 °F (37.3 °C) 99 %      MAP       --          Physical Exam    Vitals reviewed.  Constitutional: She appears well-developed and well-nourished.  Non-toxic appearance. She does not have a sickly appearance.   Tearful.   HENT:   Head: Atraumatic.   Mouth/Throat: Oropharynx is clear and moist.   Eyes: EOM are normal.   Neck: Normal range of motion, full passive range of motion without pain and phonation normal. Neck supple.   Cardiovascular: Regular rhythm. Tachycardia present.    Pulses:       Radial pulses are 2+ on the right side, and 2+ on the left side.   Pulmonary/Chest: No respiratory distress.   Musculoskeletal:        Left shoulder: She exhibits decreased range of motion, tenderness and pain. She exhibits no bony tenderness, no swelling, no effusion, no crepitus, no deformity, no laceration, no spasm, normal pulse and normal strength.   Decreased ROM secondary pain.  No overlying skin changes.  Sensation and strength intact in BUE.  Radial pulses equal bilaterally.   Neurological: She is alert and oriented to person, place, and time. She has normal strength. No sensory deficit.   Skin: Skin is warm. No rash noted.   Psychiatric: She has a normal mood and affect.         ED Course   Procedures  Labs Reviewed - No data to display       Imaging Results          X-Ray Shoulder 2 or More Views Left (Final result)  Result time 12/26/19 16:33:11   Procedure changed from X-Ray Shoulder Left 1 View     Final result by Lazaro Sosa MD (12/26/19 16:33:11)                 Impression:      No evidence of acute fracture or dislocation of the left shoulder.    Calcifications along the left biceps tendons.  The findings most likely represent calcific tendinitis.      Electronically signed by: Lazaro Sosa MD  Date:    12/26/2019  Time:    16:33             Narrative:    EXAMINATION:  XR SHOULDER COMPLETE 2 OR MORE VIEWS LEFT    CLINICAL HISTORY:  pain;Pain, unspecified    TECHNIQUE:  Two or three views of the left shoulder were  performed.    COMPARISON:  None    FINDINGS:  The bone mineralization is within normal limits.  There is no evidence of periostitis.  No cortical step-off is identified.    The glenohumeral articulation is maintained.  There is arthropathy involving the left acromioclavicular joint.  The coracoclavicular interval is within normal limits.  There are calcifications along the biceps tendon.    The visualized left hemithorax is within normal limits.    There is no evidence of a fracture or dislocation.                                 Medical Decision Making:   History:   Old Medical Records: I decided to obtain old medical records.  Initial Assessment:   44-year-old female presents ED for evaluation of left shoulder pain x4 days.  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  Patient states that the pain did not get worse until today.  Has been taking Tylenol arthritis with no improvement.  States the pain is worse with movement palpation.  Denies any previous injuries.  Denies any alleviating factors.  Denies fever, numbness, tingling, weakness, chest pain, shortness of breath, or any other concerns.        Clinical Tests:   Lab Tests: Reviewed and Ordered  Radiological Study: Reviewed and Ordered  ED Management:  UPT negative.  X-ray shows no evidence of acute fracture or dislocation of the left shoulder. Calcifications along the left biceps tendons.  The findings most likely represent calcific tendinitis.  No signs of septic joint or cellulitis.  Patient initially slightly tachycardic and her blood pressure was elevated initially in ED.  I believe patient's blood pressure is elevated due to medication noncompliance, patient's situation, and pain. I do not suspect ACS, hypertensive urgency, or emergency.  Advised patient to follow up with PCP in approximately 1 week and to keep a daily recording of blood pressure readings.  Patient's  tachycardia resolved in ED without any acute intervention.  Patient's signs symptoms most likely due to tendinitis.  Patient is hemodynamically stable be discharged home with prescriptions for Toradol, Robaxin, lidocaine patch.  No contraindications to NSAIDs.  Patient instructed not to drive, drink alcohol, operate machinery while taking Robaxin.  Instructed to follow up with [facility] in 2-3 days and to return to ED for any concerns or worsening symptoms.  Patient verbalized understanding, compliance, and agreement with treatment plan.                                 Clinical Impression:       ICD-10-CM ICD-9-CM   1. Acute pain of left shoulder M25.512 719.41   2. Pain R52 780.96            I, Jorge Ardon, personally performed the services described in this documentation. All medical record entries made by the scribe were at my direction and in my presence.  I have reviewed the chart and agree that the record reflects my personal performance and is accurate and complete. JAYSON Rooney.  8:23 PM 12/26/2019                 Jorge Ardon NP  12/26/19 2023

## 2019-12-26 NOTE — DISCHARGE INSTRUCTIONS
Use RICE take prescribed medications as labeled.  Do not drive, drink alcohol, operate machinery while taking Robaxin.  Do not take other NSAIDs with Toradol such as ibuprofen, Motrin, Aleve, Advil, naproxen, or aspirin.  Follow-up with [facility] in 2-3 days return to ED for any concerns or worsening symptoms.

## 2022-01-04 ENCOUNTER — HOSPITAL ENCOUNTER (EMERGENCY)
Facility: HOSPITAL | Age: 47
Discharge: HOME | End: 2022-01-04
Attending: EMERGENCY MEDICINE
Payer: MEDICAID

## 2022-01-04 VITALS
OXYGEN SATURATION: 98 % | RESPIRATION RATE: 15 BRPM | BODY MASS INDEX: 27.44 KG/M2 | TEMPERATURE: 98 F | DIASTOLIC BLOOD PRESSURE: 78 MMHG | HEIGHT: 62 IN | SYSTOLIC BLOOD PRESSURE: 161 MMHG | HEART RATE: 85 BPM

## 2022-01-04 DIAGNOSIS — R11.2 NON-INTRACTABLE VOMITING WITH NAUSEA, UNSPECIFIED VOMITING TYPE: ICD-10-CM

## 2022-01-04 DIAGNOSIS — R10.84 GENERALIZED ABDOMINAL PAIN: Primary | ICD-10-CM

## 2022-01-04 LAB
ALBUMIN SERPL BCP-MCNC: 4.2 G/DL (ref 3.5–5.2)
ALP SERPL-CCNC: 49 U/L (ref 55–135)
ALT SERPL W/O P-5'-P-CCNC: 20 U/L (ref 10–44)
AMPHET+METHAMPHET UR QL: NEGATIVE
ANION GAP SERPL CALC-SCNC: 11 MMOL/L (ref 8–16)
AST SERPL-CCNC: 17 U/L (ref 10–40)
B-HCG UR QL: NEGATIVE
BARBITURATES UR QL SCN>200 NG/ML: NEGATIVE
BASOPHILS # BLD AUTO: 0.03 K/UL (ref 0–0.2)
BASOPHILS NFR BLD: 0.2 % (ref 0–1.9)
BENZODIAZ UR QL SCN>200 NG/ML: NEGATIVE
BILIRUB SERPL-MCNC: 0.4 MG/DL (ref 0.1–1)
BILIRUB UR QL STRIP: NEGATIVE
BUN SERPL-MCNC: 9 MG/DL (ref 6–20)
BZE UR QL SCN: NEGATIVE
CALCIUM SERPL-MCNC: 9 MG/DL (ref 8.7–10.5)
CANNABINOIDS UR QL SCN: ABNORMAL
CHLORIDE SERPL-SCNC: 105 MMOL/L (ref 95–110)
CLARITY UR: CLEAR
CO2 SERPL-SCNC: 22 MMOL/L (ref 23–29)
COLOR UR: COLORLESS
CREAT SERPL-MCNC: 0.8 MG/DL (ref 0.5–1.4)
CREAT UR-MCNC: 47.2 MG/DL (ref 15–325)
CTP QC/QA: YES
DIFFERENTIAL METHOD: ABNORMAL
EOSINOPHIL # BLD AUTO: 0.1 K/UL (ref 0–0.5)
EOSINOPHIL NFR BLD: 0.4 % (ref 0–8)
ERYTHROCYTE [DISTWIDTH] IN BLOOD BY AUTOMATED COUNT: 12.6 % (ref 11.5–14.5)
EST. GFR  (AFRICAN AMERICAN): >60 ML/MIN/1.73 M^2
EST. GFR  (NON AFRICAN AMERICAN): >60 ML/MIN/1.73 M^2
GLUCOSE SERPL-MCNC: 110 MG/DL (ref 70–110)
GLUCOSE UR QL STRIP: NEGATIVE
HCT VFR BLD AUTO: 39.3 % (ref 37–48.5)
HGB BLD-MCNC: 13.3 G/DL (ref 12–16)
HGB UR QL STRIP: NEGATIVE
IMM GRANULOCYTES # BLD AUTO: 0.08 K/UL (ref 0–0.04)
IMM GRANULOCYTES NFR BLD AUTO: 0.6 % (ref 0–0.5)
KETONES UR QL STRIP: NEGATIVE
LACTATE SERPL-SCNC: 1.2 MMOL/L (ref 0.5–2.2)
LACTATE SERPL-SCNC: 3.1 MMOL/L (ref 0.5–2.2)
LEUKOCYTE ESTERASE UR QL STRIP: NEGATIVE
LIPASE SERPL-CCNC: 26 U/L (ref 4–60)
LYMPHOCYTES # BLD AUTO: 1.3 K/UL (ref 1–4.8)
LYMPHOCYTES NFR BLD: 9.8 % (ref 18–48)
MCH RBC QN AUTO: 32.4 PG (ref 27–31)
MCHC RBC AUTO-ENTMCNC: 33.8 G/DL (ref 32–36)
MCV RBC AUTO: 96 FL (ref 82–98)
METHADONE UR QL SCN>300 NG/ML: NEGATIVE
MONOCYTES # BLD AUTO: 1 K/UL (ref 0.3–1)
MONOCYTES NFR BLD: 7.2 % (ref 4–15)
NEUTROPHILS # BLD AUTO: 10.9 K/UL (ref 1.8–7.7)
NEUTROPHILS NFR BLD: 81.8 % (ref 38–73)
NITRITE UR QL STRIP: NEGATIVE
NRBC BLD-RTO: 0 /100 WBC
OPIATES UR QL SCN: NEGATIVE
PCP UR QL SCN>25 NG/ML: NEGATIVE
PH UR STRIP: 8 [PH] (ref 5–8)
PLATELET # BLD AUTO: 257 K/UL (ref 150–450)
PMV BLD AUTO: 9 FL (ref 9.2–12.9)
POTASSIUM SERPL-SCNC: 4.1 MMOL/L (ref 3.5–5.1)
PROT SERPL-MCNC: 7.6 G/DL (ref 6–8.4)
PROT UR QL STRIP: NEGATIVE
RBC # BLD AUTO: 4.11 M/UL (ref 4–5.4)
SODIUM SERPL-SCNC: 138 MMOL/L (ref 136–145)
SP GR UR STRIP: 1.01 (ref 1–1.03)
TOXICOLOGY INFORMATION: ABNORMAL
URN SPEC COLLECT METH UR: ABNORMAL
UROBILINOGEN UR STRIP-ACNC: NEGATIVE EU/DL
WBC # BLD AUTO: 13.33 K/UL (ref 3.9–12.7)

## 2022-01-04 PROCEDURE — 96374 THER/PROPH/DIAG INJ IV PUSH: CPT

## 2022-01-04 PROCEDURE — 83605 ASSAY OF LACTIC ACID: CPT | Performed by: EMERGENCY MEDICINE

## 2022-01-04 PROCEDURE — 96375 TX/PRO/DX INJ NEW DRUG ADDON: CPT

## 2022-01-04 PROCEDURE — 81003 URINALYSIS AUTO W/O SCOPE: CPT | Mod: 59 | Performed by: EMERGENCY MEDICINE

## 2022-01-04 PROCEDURE — 96372 THER/PROPH/DIAG INJ SC/IM: CPT | Mod: 59

## 2022-01-04 PROCEDURE — 25500020 PHARM REV CODE 255: Performed by: EMERGENCY MEDICINE

## 2022-01-04 PROCEDURE — 25000003 PHARM REV CODE 250: Performed by: EMERGENCY MEDICINE

## 2022-01-04 PROCEDURE — 85025 COMPLETE CBC W/AUTO DIFF WBC: CPT | Performed by: EMERGENCY MEDICINE

## 2022-01-04 PROCEDURE — 80307 DRUG TEST PRSMV CHEM ANLYZR: CPT | Performed by: EMERGENCY MEDICINE

## 2022-01-04 PROCEDURE — 63600175 PHARM REV CODE 636 W HCPCS: Performed by: EMERGENCY MEDICINE

## 2022-01-04 PROCEDURE — 83690 ASSAY OF LIPASE: CPT | Performed by: EMERGENCY MEDICINE

## 2022-01-04 PROCEDURE — 80053 COMPREHEN METABOLIC PANEL: CPT | Performed by: EMERGENCY MEDICINE

## 2022-01-04 PROCEDURE — 81025 URINE PREGNANCY TEST: CPT | Performed by: EMERGENCY MEDICINE

## 2022-01-04 PROCEDURE — 99285 EMERGENCY DEPT VISIT HI MDM: CPT | Mod: 25

## 2022-01-04 RX ORDER — DICYCLOMINE HYDROCHLORIDE 10 MG/ML
20 INJECTION INTRAMUSCULAR
Status: COMPLETED | OUTPATIENT
Start: 2022-01-04 | End: 2022-01-04

## 2022-01-04 RX ORDER — ONDANSETRON 2 MG/ML
4 INJECTION INTRAMUSCULAR; INTRAVENOUS
Status: COMPLETED | OUTPATIENT
Start: 2022-01-04 | End: 2022-01-04

## 2022-01-04 RX ORDER — KETOROLAC TROMETHAMINE 30 MG/ML
15 INJECTION, SOLUTION INTRAMUSCULAR; INTRAVENOUS
Status: COMPLETED | OUTPATIENT
Start: 2022-01-04 | End: 2022-01-04

## 2022-01-04 RX ORDER — SODIUM CHLORIDE 9 MG/ML
INJECTION, SOLUTION INTRAVENOUS
Status: COMPLETED | OUTPATIENT
Start: 2022-01-04 | End: 2022-01-04

## 2022-01-04 RX ORDER — DICYCLOMINE HYDROCHLORIDE 20 MG/1
20 TABLET ORAL 2 TIMES DAILY PRN
Qty: 20 TABLET | Refills: 0 | Status: SHIPPED | OUTPATIENT
Start: 2022-01-04 | End: 2022-02-03

## 2022-01-04 RX ORDER — ONDANSETRON 4 MG/1
4 TABLET, ORALLY DISINTEGRATING ORAL EVERY 8 HOURS PRN
Qty: 12 TABLET | Refills: 0 | Status: SHIPPED | OUTPATIENT
Start: 2022-01-04 | End: 2022-01-07

## 2022-01-04 RX ADMIN — KETOROLAC TROMETHAMINE 15 MG: 30 INJECTION, SOLUTION INTRAMUSCULAR at 10:01

## 2022-01-04 RX ADMIN — DICYCLOMINE HYDROCHLORIDE 20 MG: 20 INJECTION, SOLUTION INTRAMUSCULAR at 10:01

## 2022-01-04 RX ADMIN — ONDANSETRON 4 MG: 2 INJECTION INTRAMUSCULAR; INTRAVENOUS at 08:01

## 2022-01-04 RX ADMIN — IOHEXOL 75 ML: 350 INJECTION, SOLUTION INTRAVENOUS at 09:01

## 2022-01-04 RX ADMIN — SODIUM CHLORIDE: 0.9 INJECTION, SOLUTION INTRAVENOUS at 07:01

## 2022-01-04 NOTE — PHARMACY MED REC
"Admission Medication History     The home medication history was taken by Janiya Osborne CPhT.    Medication history obtained from,Patient verified    You may go to "Admission" then "Reconcile Home Medications" tabs to review and/or act upon these items.      The home medication list has been updated by the Pharmacy department.    Please read ALL comments highlighted in yellow.    Please address this information as you see fit.     Feel free to contact us if you have any questions or require assistance.      The medications listed below were removed from the home medication list.  Please reorder if appropriate:  Patient reports no longer taking the following medication(s):   toradol 10mg   lidoderm 5% patch           Janiya Osborne CPhT.  Ext [phone]                  .          "

## 2022-01-04 NOTE — ED NOTES
46 y.o. female to ED with c.o. lower abdominal pain with nausea and vomiting since 0400 this morning. Patient states she has vomited 4-5 times, reports her emesis was initially yellow and the last few times it was clear with red streaks. Patient denies fever/chills, denies chest pain/ cough/ shortness of breath. Patient awake, alert, and oriented x 4. No apparent distress noted. VS currently stable. Patient assisted onto stretcher and changed into a gown. Patient placed on cardiac monitor, continuous pulse oximetry and automatic blood pressure cuff. Bed placed in low locked position, side rails up x 2, call light is within reach of patient orientation to room and explanation of wait provided to patient, alarms set and turned on for monitor and pulse ox, awaiting MD evaluation and orders, will continue to monitor.

## 2022-01-04 NOTE — ED NOTES
Patient unable to urinate at this time. Patient educated on need for urine sample and instructed to call RN when able to urinate.\

## 2022-01-04 NOTE — Clinical Note
"Pattie Gaviria (Shelita) was seen and treated in our emergency department on 1/4/2022.  She may return to work on 01/06/2022.       If you have any questions or concerns, please don't hesitate to call.      Dar Maguire MD"

## 2022-01-04 NOTE — ED PROVIDER NOTES
Encounter Date: 2022       History     Chief Complaint   Patient presents with    Abdominal Pain     Lower abdominal pain started this am, pain is relieved with repositioned. No fever or diarrrhea    Vomiting     5 episodes of vomiting this am. Unable to tolerate po fluids. No fever or diarrhea     Patient is a 46-year-old female with no significant past medical history presents to the ED with complaint of abdominal pain.  Patient reports acute onset of bilateral lower abdominal pain that started this morning described as stabbing without radiation.  She says the pain is improved with reposition of her body.  She does endorse nausea and vomiting.  She denies any chest pain, shortness of breath, fevers, chills.  She denies any suspicious food intake or recent travel.  She denies any known sick contacts.  Patient states that she was seen in the ED  for a similar complaint approximately 3 months ago which time they were unable to determine the cause. She has not taken anything for her symptoms.         Review of patient's allergies indicates:   Allergen Reactions    Opioids - morphine analogues Shortness Of Breath     No past medical history on file.  Past Surgical History:   Procedure Laterality Date     SECTION      TUBAL LIGATION       Family History   Problem Relation Age of Onset    Breast cancer Sister     Hypertension Maternal Grandfather     Glaucoma Maternal Grandfather     Cancer Father     Hypertension Maternal Uncle     Glaucoma Maternal Uncle     Hypertension Maternal Aunt     Glaucoma Maternal Aunt     Hypertension Other      Social History     Tobacco Use    Smoking status: Never Smoker    Smokeless tobacco: Never Used   Substance Use Topics    Alcohol use: No    Drug use: Yes     Types: Marijuana     Review of Systems    Physical Exam     Initial Vitals [22 0719]   BP Pulse Resp Temp SpO2   (!) 181/83 96 20 98.5 °F (36.9 °C) 100 %      MAP       --         Physical  Exam    Nursing note and vitals reviewed.  Constitutional: She appears well-developed and well-nourished. She appears distressed.   Patient appears to be distressed.      HENT:   Head: Normocephalic and atraumatic.   Right Ear: External ear normal.   Left Ear: External ear normal.   Eyes: EOM are normal. Pupils are equal, round, and reactive to light.   Neck:   Normal range of motion.  Cardiovascular: Normal rate, regular rhythm, normal heart sounds and intact distal pulses.   Pulmonary/Chest: Breath sounds normal.   Abdominal: Abdomen is soft. She exhibits no distension. There is abdominal tenderness.   Diffuse abdominal tenderness  No rebound  No guarding   No peritoneal signs  Abdomen is soft, however  Normal bowel sounds in all four quadrants  There is no rebound and no guarding.   Musculoskeletal:         General: Normal range of motion.      Cervical back: Normal range of motion.     Neurological: She is alert and oriented to person, place, and time. She has normal strength. GCS score is 15. GCS eye subscore is 4. GCS verbal subscore is 5. GCS motor subscore is 6.   Skin: Skin is warm and dry. Capillary refill takes less than 2 seconds.   Psychiatric: She has a normal mood and affect.         ED Course   Procedures  Labs Reviewed   CBC W/ AUTO DIFFERENTIAL - Abnormal; Notable for the following components:       Result Value    WBC 13.33 (*)     MCH 32.4 (*)     MPV 9.0 (*)     Immature Granulocytes 0.6 (*)     Gran # (ANC) 10.9 (*)     Immature Grans (Abs) 0.08 (*)     Gran % 81.8 (*)     Lymph % 9.8 (*)     All other components within normal limits   COMPREHENSIVE METABOLIC PANEL - Abnormal; Notable for the following components:    CO2 22 (*)     Alkaline Phosphatase 49 (*)     All other components within normal limits   LACTIC ACID, PLASMA - Abnormal; Notable for the following components:    Lactate (Lactic Acid) 3.1 (*)     All other components within normal limits   DRUG SCREEN PANEL, URINE EMERGENCY -  Abnormal; Notable for the following components:    THC Presumptive Positive (*)     All other components within normal limits    Narrative:     Specimen Source->Urine   URINALYSIS, REFLEX TO URINE CULTURE - Abnormal; Notable for the following components:    Color, UA Colorless (*)     All other components within normal limits    Narrative:     Specimen Source->Urine   LIPASE   LACTIC ACID, PLASMA    Narrative:     Please recheck following completion of IVF.   POCT URINE PREGNANCY          Imaging Results          CT Abdomen Pelvis With Contrast (Final result)  Result time 01/04/22 09:59:08    Final result by Danylele Cedeno MD (01/04/22 09:59:08)                 Impression:      Unremarkable CT of the abdomen and pelvis.  No clear cause for the patient's abdominal pain.      Electronically signed by: Danyelle Cedeno  Date:    01/04/2022  Time:    09:59             Narrative:    EXAMINATION:  CT ABDOMEN PELVIS WITH CONTRAST    CLINICAL HISTORY:  Abdominal pain, acute, nonlocalized;    TECHNIQUE:  Low dose axial images, sagittal and coronal reformations were obtained from the lung bases to the pubic symphysis following the IV administration of 75 mL of Omnipaque 350 .  Oral contrast was not given.    COMPARISON:  9/6/2017    FINDINGS:  Lower chest: Unremarkable.    Liver: Unremarkable.    Gallbladder and bile ducts: Unremarkable. No biliary ductal dilatation.    Pancreas: Unremarkable.    Spleen: Unremarkable.    Adrenals: Unremarkable.    Kidneys: Unremarkable.    Lymph nodes: No abdominal or pelvic lymphadenopathy.    Bowel and mesentery: Unremarkable.    Abdominal aorta: Unremarkable.    Inferior vena cava: Unremarkable.    Free fluid or free air: None.    Pelvis: Unremarkable.    Body wall: Unremarkable.    Bones: Unremarkable.                                 Medications   dicyclomine injection 20 mg (has no administration in time range)   0.9%  NaCl infusion ( Intravenous Stopped 1/4/22 8046)   ondansetron  injection 4 mg (4 mg Intravenous Given 1/4/22 0801)   ketorolac injection 15 mg (15 mg Intravenous Given 1/4/22 1004)   iohexoL (OMNIPAQUE 350) injection 75 mL (75 mLs Intravenous Given 1/4/22 9920)     Medical Decision Making:   Clinical Tests:   Lab Tests: Reviewed and Ordered  Radiological Study: Ordered and Reviewed  ED Management:  - VSS; pt afebrile; NAD  - CBC w/diff H/H stable; WBC 13K  - CMP without significant electrolyte abnormality; renal function WNL  - Lactic acid 3.1; repeat following fluids 1.2  - UA without findings suggestive of infection   - UDS +THC   - pt reports improvement of pain s/p ketorolac, bentyl (pt unable to tolerate morphine analogues)  - pt able to tolerate PO prior to discharge  - pt stable for discharge  - pt comfortable with plan for discharge at this time  - No further intervention is indicated at this time after having taken into account the patient's history, physical exam findings, and empirical and objective data obtained during the patient's emergency department workup.   - The patient is at low risk for an emergent medical condition at this time, and I am of the belief that that it is safe to discharge the patient from the emergency department.   - The patient is instructed to follow up as outpatient as indicated on the discharge paperwork.    - I have discussed the specifics of the workup with the patient and the patient has verbalized understanding of the details of the workup, the diagnosis, the treatment plan, and the need for outpatient follow-up.    - Although the patient has no emergent etiology today this does not preclude the development of an emergent condition so, in addition, I have advised the patient that they can return to the ED and/or activate EMS at any time with worsening of their symptoms, change of their symptoms, or with any other medical complaint.    - The patient remained comfortable and stable during their visit in the ED.    - Discharge and  "follow-up instructions discussed with the patient who expressed understanding and willingness to comply with my recommendations.  - Results of all emergency department tests  discussed thoroughly with patient; all patient questions answered; pt in agreement with plan  - Pt instructed to follow up with PCP in 2-3 days for recheck of today's complaints  - Pt given strict emergency department return precautions for any new or worsening of symptoms  - Pt discharged from the emergency department in stable condition, in no acute distress               ED Course as of 01/04/22 1048   Tue Jan 04, 2022   1032 On re-evaluation, patient resting comfortably in bed.  No acute distress noted.  Results of laboratory tests, CT scan discussed with patient.  Patient stated me that she does smoke marijuana and that she has had several episodes of "loose stools." She denies any known sick contacts. [LC]   1036 Following administration of IV fluids, repeat lactic acid within normal limits. [LC]      ED Course User Index  [LC] Dar Maguire MD             Clinical Impression:   Final diagnoses:  [R10.84] Generalized abdominal pain (Primary)  [R11.2] Non-intractable vomiting with nausea, unspecified vomiting type          ED Disposition Condition    Discharge Stable        ED Prescriptions     Medication Sig Dispense Start Date End Date Auth. Provider    dicyclomine (BENTYL) 20 mg tablet Take 1 tablet (20 mg total) by mouth 2 (two) times daily as needed (abdominal cramping). 20 tablet 1/4/2022 2/3/2022 Dar Maguire MD    ondansetron (ZOFRAN-ODT) 4 MG TbDL Take 1 tablet (4 mg total) by mouth every 8 (eight) hours as needed (nausea). 12 tablet 1/4/2022 1/7/2022 Dar Maguire MD        Follow-up Information     Follow up With Specialties Details Why Contact Info    Radha Flower MD Obstetrics, Obstetrics and Gynecology Schedule an appointment as soon as possible for a visit   [address] [facility]  [address] [facility]  [place] " [address]  [phone]             Dar Maguire MD  01/04/22 1038       Dar Maguire MD  01/04/22 1042

## 2022-02-10 ENCOUNTER — OFFICE VISIT (OUTPATIENT)
Dept: OBSTETRICS AND GYNECOLOGY | Facility: CLINIC | Age: 47
End: 2022-02-10
Payer: MEDICAID

## 2022-02-10 VITALS
WEIGHT: 175.94 LBS | SYSTOLIC BLOOD PRESSURE: 122 MMHG | DIASTOLIC BLOOD PRESSURE: 72 MMHG | BODY MASS INDEX: 32.18 KG/M2

## 2022-02-10 DIAGNOSIS — Z01.419 WELL WOMAN EXAM WITH ROUTINE GYNECOLOGICAL EXAM: Primary | ICD-10-CM

## 2022-02-10 PROCEDURE — 1160F PR REVIEW ALL MEDS BY PRESCRIBER/CLIN PHARMACIST DOCUMENTED: ICD-10-PCS | Mod: CPTII,,, | Performed by: OBSTETRICS & GYNECOLOGY

## 2022-02-10 PROCEDURE — 99999 PR PBB SHADOW E&M-EST. PATIENT-LVL II: CPT | Mod: PBBFAC,,, | Performed by: OBSTETRICS & GYNECOLOGY

## 2022-02-10 PROCEDURE — 3078F PR MOST RECENT DIASTOLIC BLOOD PRESSURE < 80 MM HG: ICD-10-PCS | Mod: CPTII,,, | Performed by: OBSTETRICS & GYNECOLOGY

## 2022-02-10 PROCEDURE — 99386 PREV VISIT NEW AGE 40-64: CPT | Mod: S$PBB,,, | Performed by: OBSTETRICS & GYNECOLOGY

## 2022-02-10 PROCEDURE — 99386 PR PREVENTIVE VISIT,NEW,40-64: ICD-10-PCS | Mod: S$PBB,,, | Performed by: OBSTETRICS & GYNECOLOGY

## 2022-02-10 PROCEDURE — 1159F MED LIST DOCD IN RCRD: CPT | Mod: CPTII,,, | Performed by: OBSTETRICS & GYNECOLOGY

## 2022-02-10 PROCEDURE — 1160F RVW MEDS BY RX/DR IN RCRD: CPT | Mod: CPTII,,, | Performed by: OBSTETRICS & GYNECOLOGY

## 2022-02-10 PROCEDURE — 99999 PR PBB SHADOW E&M-EST. PATIENT-LVL II: ICD-10-PCS | Mod: PBBFAC,,, | Performed by: OBSTETRICS & GYNECOLOGY

## 2022-02-10 PROCEDURE — 1159F PR MEDICATION LIST DOCUMENTED IN MEDICAL RECORD: ICD-10-PCS | Mod: CPTII,,, | Performed by: OBSTETRICS & GYNECOLOGY

## 2022-02-10 PROCEDURE — 3008F PR BODY MASS INDEX (BMI) DOCUMENTED: ICD-10-PCS | Mod: CPTII,,, | Performed by: OBSTETRICS & GYNECOLOGY

## 2022-02-10 PROCEDURE — 3078F DIAST BP <80 MM HG: CPT | Mod: CPTII,,, | Performed by: OBSTETRICS & GYNECOLOGY

## 2022-02-10 PROCEDURE — 3074F SYST BP LT 130 MM HG: CPT | Mod: CPTII,,, | Performed by: OBSTETRICS & GYNECOLOGY

## 2022-02-10 PROCEDURE — 88175 CYTOPATH C/V AUTO FLUID REDO: CPT | Performed by: OBSTETRICS & GYNECOLOGY

## 2022-02-10 PROCEDURE — 99212 OFFICE O/P EST SF 10 MIN: CPT | Mod: PBBFAC,PO | Performed by: OBSTETRICS & GYNECOLOGY

## 2022-02-10 PROCEDURE — 3008F BODY MASS INDEX DOCD: CPT | Mod: CPTII,,, | Performed by: OBSTETRICS & GYNECOLOGY

## 2022-02-10 PROCEDURE — 3074F PR MOST RECENT SYSTOLIC BLOOD PRESSURE < 130 MM HG: ICD-10-PCS | Mod: CPTII,,, | Performed by: OBSTETRICS & GYNECOLOGY

## 2022-02-10 NOTE — PROGRESS NOTES
HPI:   46 y.o.   OB History        3    Para   3    Term   3       0    AB   0    Living   3       SAB   0    IAB   0    Ectopic   0    Multiple   0    Live Births   3              Patient's last menstrual period was 2022.    Patient is  here for her annual gynecologic exam.  She has no complaints.     ROS:  GENERAL: No fever, chills, fatigability or weight loss.  SKIN: No rashes, itching or changes in color or texture of skin.  HEAD: No headaches or recent head trauma.  EYES: Visual acuity fine. No photophobia, ocular pain or diplopia.  EARS: Denies ear pain, discharge or vertigo.  NOSE: No loss of smell, no epistaxis or postnasal drip.  MOUTH & THROAT: No hoarseness or change in voice. No excessive gum bleeding.  NODES: Denies swollen glands.  CHEST: Denies CANNON, cyanosis, wheezing, cough and sputum production.  CARDIOVASCULAR: Denies chest pain, PND, orthopnea or reduced exercise tolerance.  ABDOMEN: Appetite fine. No weight loss. Denies diarrhea, abdominal pain, hematemesis or blood in stool.  URINARY: No flank pain, dysuria or hematuria.  PERIPHERAL VASCULAR: No claudication or cyanosis.  MUSCULOSKELETAL: No joint stiffness or swelling. Denies back pain.  NEUROLOGIC: No history of seizures, paralysis, alteration of gait or coordination.    PE:   /72   Wt 79.8 kg (175 lb 14.8 oz)   LMP 2022   BMI 32.18 kg/m²   APPEARANCE: Well nourished, well developed, in no acute distress.  NECK: Neck symmetric without masses or thyromegaly.  BREASTS: Symmetrical, no skin changes or visible lesions. No palpable masses, nipple discharge or adenopathy bilaterally.  ABDOMEN: Flat. Soft. No tenderness or masses. No hepatosplenomegaly. No hernias. No CVA tenderness.  VULVA: No lesions. Normal female genitalia.  URETHRAL MEATUS: Normal size and location, no lesions, no prolapse.  URETHRA: No masses, tenderness, prolapse or scarring.  VAGINA: Moist and well rugated, no discharge, no significant  cystocele or rectocele.  CERVIX: No lesions and discharge. PAP done.  UTERUS: Normal size, regular shape, mobile, non-tender, bladder base nontender.  ADNEXA: No masses, tenderness or CDS nodularity.  ANUS PERINEUM: Normal.    PROCEDURES:  Pap smear    Assessment:  Normal Gynecologic Exam    Plan:  Mammogram and Colonoscopy if indicated by current recommendations.  Return to clinic in one year or for any problems or complaints.  Section x 2, vag x 1  Gets deep pain in rectal low abd area right before BM  Cycles ok  Needs colonoscopy  Had ct mc 3, normal pelvis

## 2022-02-17 LAB
FINAL PATHOLOGIC DIAGNOSIS: NORMAL
Lab: NORMAL